# Patient Record
Sex: FEMALE | Race: BLACK OR AFRICAN AMERICAN | NOT HISPANIC OR LATINO | Employment: FULL TIME | URBAN - METROPOLITAN AREA
[De-identification: names, ages, dates, MRNs, and addresses within clinical notes are randomized per-mention and may not be internally consistent; named-entity substitution may affect disease eponyms.]

---

## 2022-07-19 DIAGNOSIS — N64.4 BREAST PAIN, RIGHT: Primary | ICD-10-CM

## 2022-09-14 ENCOUNTER — OFFICE VISIT (OUTPATIENT)
Dept: SURGERY | Facility: CLINIC | Age: 42
End: 2022-09-14
Payer: COMMERCIAL

## 2022-09-14 VITALS
SYSTOLIC BLOOD PRESSURE: 116 MMHG | HEART RATE: 70 BPM | TEMPERATURE: 98 F | DIASTOLIC BLOOD PRESSURE: 79 MMHG | RESPIRATION RATE: 18 BRPM | HEIGHT: 62 IN | OXYGEN SATURATION: 99 % | BODY MASS INDEX: 28.89 KG/M2 | WEIGHT: 157 LBS

## 2022-09-14 DIAGNOSIS — N64.4 FOCAL NON-CYCLICAL BREAST PAIN: ICD-10-CM

## 2022-09-14 DIAGNOSIS — R92.8 ABNORMAL MAMMOGRAM: Primary | ICD-10-CM

## 2022-09-14 PROCEDURE — 3008F BODY MASS INDEX DOCD: CPT | Mod: CPTII,S$GLB,, | Performed by: PHYSICIAN ASSISTANT

## 2022-09-14 PROCEDURE — 99205 OFFICE O/P NEW HI 60 MIN: CPT | Mod: S$GLB,,, | Performed by: PHYSICIAN ASSISTANT

## 2022-09-14 PROCEDURE — 3078F DIAST BP <80 MM HG: CPT | Mod: CPTII,S$GLB,, | Performed by: PHYSICIAN ASSISTANT

## 2022-09-14 PROCEDURE — 99205 PR OFFICE/OUTPT VISIT, NEW, LEVL V, 60-74 MIN: ICD-10-PCS | Mod: S$GLB,,, | Performed by: PHYSICIAN ASSISTANT

## 2022-09-14 PROCEDURE — 3074F SYST BP LT 130 MM HG: CPT | Mod: CPTII,S$GLB,, | Performed by: PHYSICIAN ASSISTANT

## 2022-09-14 PROCEDURE — 99999 PR PBB SHADOW E&M-EST. PATIENT-LVL V: CPT | Mod: PBBFAC,,, | Performed by: PHYSICIAN ASSISTANT

## 2022-09-14 PROCEDURE — 1159F PR MEDICATION LIST DOCUMENTED IN MEDICAL RECORD: ICD-10-PCS | Mod: CPTII,S$GLB,, | Performed by: PHYSICIAN ASSISTANT

## 2022-09-14 PROCEDURE — 1159F MED LIST DOCD IN RCRD: CPT | Mod: CPTII,S$GLB,, | Performed by: PHYSICIAN ASSISTANT

## 2022-09-14 PROCEDURE — 3008F PR BODY MASS INDEX (BMI) DOCUMENTED: ICD-10-PCS | Mod: CPTII,S$GLB,, | Performed by: PHYSICIAN ASSISTANT

## 2022-09-14 PROCEDURE — 99999 PR PBB SHADOW E&M-EST. PATIENT-LVL V: ICD-10-PCS | Mod: PBBFAC,,, | Performed by: PHYSICIAN ASSISTANT

## 2022-09-14 PROCEDURE — 3078F PR MOST RECENT DIASTOLIC BLOOD PRESSURE < 80 MM HG: ICD-10-PCS | Mod: CPTII,S$GLB,, | Performed by: PHYSICIAN ASSISTANT

## 2022-09-14 PROCEDURE — 3074F PR MOST RECENT SYSTOLIC BLOOD PRESSURE < 130 MM HG: ICD-10-PCS | Mod: CPTII,S$GLB,, | Performed by: PHYSICIAN ASSISTANT

## 2022-09-14 NOTE — PROGRESS NOTES
[facility] [place] [facility]  Breast Surgical Oncology  New Patient Office Visit - H&P      Referring Provider: Dr. Santosh Espinoza  PCP: Santosh Espinoza MD   Care Team:    Chief Complaint:   Chief Complaint   Patient presents with    Breast Pain     New Patient with intermittent right breast pain, sharp pain          Subjective:     HPI:  Renetta Negro is a 42 y.o. female who presents on 2022 for evaluation of  abnormal breast imaging and bilateral breast pain .     Her breast pain began last year and her PCP sent her for imaging work up at outside facility, which was determined to be BIRADS 3 (probably benign) of a right breast cyst cluster in the 6:00 and 9:00 position. Most recent imaging on 2022 at [facility] in [place] was again BIRADS 3 with findings consistent with a complicated cyst or possible enlarged duct. The pain still comes and goes, and is primarily located in the left breast UOQ and the inferior portion of the right breast. It is sometimes worse during her menstrual cycle, although they both hurt today and she is 1 week past the start of her cycle. She does not consume caffeine. She wears both wired bras and underwire bras.    She currently denies any other breast issues including rashes, redness, swelling, nipple discharge, or new lumps/masses. She has no family history of breast cancer and no prior breast issues.    Imagin2021  SCR MG at [facility] - BIRADS 0: INCOMPLETE: There is a small nodule at the 6:00 position anterior depth right breast. No suspicious findings on the left breast.    2022 Right Breast Complete US at [facility] [place] - BIRADS 3: Probably Benign: In the 6:00 position, there is a 6.6 x 5 x 5.9 mm hypoechoic lesion with septations. No other right breast sonographic masses identified. 6 month repeat US recommended.    2022 Right Breast Complete US at [facility] [place] - The previously demonstrated complex cystic nodule at the 6:00  position has increased in size now measuring 9 x 6 x 5 mm compared to 6.6 x 5 x 5.9 mm. No suspicious shadowing. There is a new mildly complex cystic nodule on the noncontrasted position measuring 4 mm without suspicious  shadowing.  Mild interval increase in size of the 6:00 benign cyst cluster with  new small cyst cluster at the 9:00 position. BIRADS: 3 - Probably benign Three-month follow-up ultrasound recommended.    Pathology:  None    OB/GYN History:  Age at Menarche Onset: 13  Menopausal Status: premenopausal, LMP: Patient's last menstrual period was 2022.  Hysterectomy/Oophorectomy: no  Hormonal birth control (duration): 3 years total  Pregnancy History:   Hormone Replacement Therapy: No, none    Other:  # of breast biopsies (when and pathology results): none  MG breast density: Unknown  Prior thoracic RT: none  Genetic testing: none  Ashkenazi Anabaptist descent: No    Family History:  Family History   Problem Relation Age of Onset    Hypertension Mother     Hypertension Father     Hypertension Maternal Grandmother     Stroke Maternal Grandmother     Stroke Maternal Grandfather         Patient History:  No past medical history on file.    Body mass index is 28.72 kg/m².     Past Surgical History:   Procedure Laterality Date    CHOLECYSTECTOMY         Social History     Socioeconomic History    Marital status: Single   Tobacco Use    Smoking status: Never    Smokeless tobacco: Never   Substance and Sexual Activity    Alcohol use: Never    Drug use: Never    Sexual activity: Not Currently         There is no immunization history on file for this patient.    Medications/Allergies:  No current outpatient medications on file.     Review of patient's allergies indicates:  No Known Allergies    Review of Systems:  Pertinent items are noted in HPI.     Objective:     Vitals:  Vitals:    22 0904   BP: 116/79   Pulse: 70   Resp: 18   Temp: 98.4 °F (36.9 °C)       Physical Exam:  General: The patient is  awake, alert and oriented times three. The patient is well nourished and in no acute distress.  Neck: There is no evidence of palpable cervical, supraclavicular or axillary adenopathy. The neck is supple. The thyroid is not enlarged.  Musculoskeletal: The patient has a normal range of motion of her bilateral upper extremities.  Chest: Examination of the chest wall fails to reveal any obvious abnormalities. Nonlabored breathing, symmetric expansion.  Breast:  Right: There is tenderness to the inferior portion of the left breast. No palpable lumps in the areas of concern on imaging (6:00 and 9:00 right breast). Examination of right breast fails to reveal any dominant masses or areas of significant focal nodularity. The nipple is everted without evidence of discharge. There is no skin dimpling with movement of the pectoralis. There are no significant skin changes overlying the breast.   Left: There is tenderness to the UOQ and axillary tail. Examination of the left breast fails to reveal any dominant masses or areas of significant focal nodularity. The nipple is everted without evidence of discharge. There is no skin dimpling with movement of the pectoralis. There are no significant skin changes overlying the breast.  Abdomen: The abdomen is soft, flat, nontender and nondistended.  Integumentary: no rashes or skin lesions present  Neurologic: cranial nerves intact, no signs of peripheral neurological deficit, motor/sensory function intact      Assessment and Plan:         Renetta was seen today for breast pain.    Diagnoses and all orders for this visit:    Abnormal mammogram  -     Mammo Digital Diagnostic Bilat with Dylan; Future  -     US Breast Right Limited; Future    Focal non-cyclical breast pain  -     Ambulatory referral/consult to Breast Surgery  -     Mammo Digital Diagnostic Bilat with Dylan; Future  -     US Breast Right Limited; Future        Plan:         I discussed ways to reduce breast pain/tenderness.  Avoid caffeine (coffee, teas, chocolate, sodas). Drinking alcohol may also increase the risk of fibrocystic changes and breast pain. I also advised to wear a good, supportive bra both day and night when symptoms are worse. Avoid underwire bras. She may also consider taking Vitamin E supplementation to reduce pain.     Recommend BL DG MG and Right Breast US to evaluate new bilateral focal breast tenderness and to evaluate abnormal imaging findings on the right breast seen at outside imaging facility. She can have this done in October when she is due for 3 month follow up of abnormal right breast US.          All of her questions were answered. She was advised to call if she develops any questions or concerns.    Christina Yuan PA-C              --------------------------------------------------------------------------------------------------------------  Total time on the date of the visit ranged from 60-74 mins (71427). Total time includes both face-to-face and non-face-to-face time personally spent by myself on the day of the visit.    Non-face-to-face time included:  _X_ preparing to see the patient such as reviewing the patient record  _X_ obtaining and reviewing separately obtained history  _X_ independently interpreting results  _X_ documenting clinical information in electronic health record.    Face-to-face time included:  _X_ performing an appropriate history and examination  _X_ communicating results to the patient  _X_ counseling and educating the patient  __ ordering appropriate medications  _x_ ordering appropriate tests  _X_ ordering appropriate procedures (including follow-up)  _X_ answering any questions the patient had    Total Time spent on date of visit: 61 minutes

## 2022-11-09 ENCOUNTER — HOSPITAL ENCOUNTER (OUTPATIENT)
Dept: RADIOLOGY | Facility: HOSPITAL | Age: 42
Discharge: HOME | End: 2022-11-09
Attending: PHYSICIAN ASSISTANT
Payer: COMMERCIAL

## 2022-11-09 DIAGNOSIS — R92.8 ABNORMAL MAMMOGRAM: ICD-10-CM

## 2022-11-09 DIAGNOSIS — N64.4 FOCAL NON-CYCLICAL BREAST PAIN: ICD-10-CM

## 2022-11-09 PROCEDURE — 76641 ULTRASOUND BREAST COMPLETE: CPT | Mod: TC,50

## 2022-11-09 PROCEDURE — 77066 DX MAMMO INCL CAD BI: CPT | Mod: TC

## 2022-11-09 PROCEDURE — 76641 ULTRASOUND BREAST COMPLETE: CPT | Mod: 26,,, | Performed by: RADIOLOGY

## 2022-11-09 PROCEDURE — 76641 US BREAST BILATERAL COMPLETE: ICD-10-PCS | Mod: 26,,, | Performed by: RADIOLOGY

## 2022-11-09 PROCEDURE — 77066 DX MAMMO INCL CAD BI: CPT | Mod: 26,,, | Performed by: RADIOLOGY

## 2022-11-09 PROCEDURE — 77062 MAMMO DIGITAL DIAGNOSTIC BILAT WITH TOMO: ICD-10-PCS | Mod: 26,,, | Performed by: RADIOLOGY

## 2022-11-09 PROCEDURE — 77062 BREAST TOMOSYNTHESIS BI: CPT | Mod: 26,,, | Performed by: RADIOLOGY

## 2022-11-09 PROCEDURE — 77066 MAMMO DIGITAL DIAGNOSTIC BILAT WITH TOMO: ICD-10-PCS | Mod: 26,,, | Performed by: RADIOLOGY

## 2022-11-23 ENCOUNTER — OFFICE VISIT (OUTPATIENT)
Dept: SURGERY | Facility: CLINIC | Age: 42
End: 2022-11-23
Payer: COMMERCIAL

## 2022-11-23 VITALS
HEART RATE: 80 BPM | TEMPERATURE: 98 F | HEIGHT: 62 IN | WEIGHT: 161.19 LBS | BODY MASS INDEX: 29.66 KG/M2 | SYSTOLIC BLOOD PRESSURE: 125 MMHG | OXYGEN SATURATION: 97 % | DIASTOLIC BLOOD PRESSURE: 80 MMHG | RESPIRATION RATE: 18 BRPM

## 2022-11-23 DIAGNOSIS — N60.12 FIBROCYSTIC BREAST CHANGES OF BOTH BREASTS: ICD-10-CM

## 2022-11-23 DIAGNOSIS — N60.09 BENIGN BREAST CYST IN FEMALE, UNSPECIFIED LATERALITY: Primary | ICD-10-CM

## 2022-11-23 DIAGNOSIS — N60.11 FIBROCYSTIC BREAST CHANGES OF BOTH BREASTS: ICD-10-CM

## 2022-11-23 PROCEDURE — 99999 PR PBB SHADOW E&M-EST. PATIENT-LVL III: CPT | Mod: PBBFAC,,, | Performed by: PHYSICIAN ASSISTANT

## 2022-11-23 PROCEDURE — 99213 PR OFFICE/OUTPT VISIT, EST, LEVL III, 20-29 MIN: ICD-10-PCS | Mod: S$GLB,,, | Performed by: PHYSICIAN ASSISTANT

## 2022-11-23 PROCEDURE — 3079F DIAST BP 80-89 MM HG: CPT | Mod: CPTII,S$GLB,, | Performed by: PHYSICIAN ASSISTANT

## 2022-11-23 PROCEDURE — 1159F MED LIST DOCD IN RCRD: CPT | Mod: CPTII,S$GLB,, | Performed by: PHYSICIAN ASSISTANT

## 2022-11-23 PROCEDURE — 99999 PR PBB SHADOW E&M-EST. PATIENT-LVL III: ICD-10-PCS | Mod: PBBFAC,,, | Performed by: PHYSICIAN ASSISTANT

## 2022-11-23 PROCEDURE — 1159F PR MEDICATION LIST DOCUMENTED IN MEDICAL RECORD: ICD-10-PCS | Mod: CPTII,S$GLB,, | Performed by: PHYSICIAN ASSISTANT

## 2022-11-23 PROCEDURE — 3074F SYST BP LT 130 MM HG: CPT | Mod: CPTII,S$GLB,, | Performed by: PHYSICIAN ASSISTANT

## 2022-11-23 PROCEDURE — 3008F BODY MASS INDEX DOCD: CPT | Mod: CPTII,S$GLB,, | Performed by: PHYSICIAN ASSISTANT

## 2022-11-23 PROCEDURE — 99213 OFFICE O/P EST LOW 20 MIN: CPT | Mod: S$GLB,,, | Performed by: PHYSICIAN ASSISTANT

## 2022-11-23 PROCEDURE — 3008F PR BODY MASS INDEX (BMI) DOCUMENTED: ICD-10-PCS | Mod: CPTII,S$GLB,, | Performed by: PHYSICIAN ASSISTANT

## 2022-11-23 PROCEDURE — 3079F PR MOST RECENT DIASTOLIC BLOOD PRESSURE 80-89 MM HG: ICD-10-PCS | Mod: CPTII,S$GLB,, | Performed by: PHYSICIAN ASSISTANT

## 2022-11-23 PROCEDURE — 3074F PR MOST RECENT SYSTOLIC BLOOD PRESSURE < 130 MM HG: ICD-10-PCS | Mod: CPTII,S$GLB,, | Performed by: PHYSICIAN ASSISTANT

## 2022-11-23 NOTE — PROGRESS NOTES
[facility]  Breast Surgical Oncology  New Patient Office Visit - H&P      Referring Provider: No ref. provider found  PCP: Santosh Espinoza MD   Care Team:    Chief Complaint:   Chief Complaint   Patient presents with    Follow-up     Patient presents today for a follow up for CBE and MG/US results. Patient is well, does not have any breast pain.       Subjective:     Interval History:  2022 - Renetta Negro returns today for follow up. Repeat diagnostic breast imaging was performed 2022 at [facility] with attention to patient's area of pain and follow up of right breast probably benign masses. All findings on the MG and US are benign.     HPI:  Renetta Negro is a 42 y.o. female who initially presents on 2022 for evaluation of  abnormal breast imaging and bilateral breast pain .     Her breast pain began last year and her PCP sent her for imaging work up at outside facility, which was determined to be BIRADS 3 (probably benign) of a right breast cyst cluster in the 6:00 and 9:00 position. Most recent imaging on 2022 at [facility] [place] was again BIRADS 3 with findings consistent with a complicated cyst or possible enlarged duct. The pain still comes and goes, and is primarily located in the left breast UOQ and the inferior portion of the right breast. It is sometimes worse during her menstrual cycle, although they both hurt today and she is 1 week past the start of her cycle. She does not consume caffeine. She wears both wired bras and underwire bras.    She currently denies any other breast issues including rashes, redness, swelling, nipple discharge, or new lumps/masses. She has no family history of breast cancer and no prior breast issues.    Imagin2021 BL SCR MG at [facility] - BIRADS 0: INCOMPLETE: There is a small nodule at the 6:00 position anterior depth right breast. No suspicious findings on the left breast.    2022 Right  Breast Complete US at [facility] [place] - BIRADS 3: Probably Benign: In the 6:00 position, there is a 6.6 x 5 x 5.9 mm hypoechoic lesion with septations. No other right breast sonographic masses identified. 6 month repeat US recommended.    7/12/2022 Right Breast Complete US at [facility] - The previously demonstrated complex cystic nodule at the 6:00 position has increased in size now measuring 9 x 6 x 5 mm compared to 6.6 x 5 x 5.9 mm. No suspicious shadowing. There is a new mildly complex cystic nodule on the noncontrasted position measuring 4 mm without suspicious  shadowing.  Mild interval increase in size of the 6:00 benign cyst cluster with  new small cyst cluster at the 9:00 position. BIRADS: 3 - Probably benign Three-month follow-up ultrasound recommended.    11/9/2022 BL DG MG and BL Breast US to evaluate bilateral breast pain and to evaluate probably benign masses in the right breast - BIRADS 2: BENIGN: On mammogram: No suspicious masses, calcifications, or other signs of malignancy are identified.   There are multiple circumscribed masses in both breasts compatible with cysts, relatively unchanged from the prior exam.   No single lesion is suspicious for malignancy. On US: Complete ultrasound evaluation of the bilateral breasts and axillae was performed.  No suspicious sonographic abnormality is seen.  A few benign cysts of varying size and complexity are noted in the bilateral breasts.  The largest in the right breast is an 8 x 5 x 8 mm benign cyst cluster in the 6:00 subareolar right breast.  The largest in the left breast is a 6 x 4 x 3 mm benign, simple cyst in the 4:00 left breast, 5 cm from the nipple.  Benign-appearing lymph nodes are noted in the bilateral axillae.    Pathology:  None    OB/GYN History:  Age at Menarche Onset: 13  Menopausal Status: premenopausal, LMP: Patient's last menstrual period was 11/10/2022.  Hysterectomy/Oophorectomy: no  Hormonal birth control (duration): 3 years  total  Pregnancy History:   Hormone Replacement Therapy: No, none    Other:  # of breast biopsies (when and pathology results): none  MG breast density: Unknown  Prior thoracic RT: none  Genetic testing: none  Ashkenazi Holiness descent: No    Family History:  Family History   Problem Relation Age of Onset    Hypertension Mother     Hypertension Father     Hypertension Maternal Grandmother     Stroke Maternal Grandmother     Stroke Maternal Grandfather         Patient History:  No past medical history on file.    Body mass index is 29.48 kg/m².     Past Surgical History:   Procedure Laterality Date    CHOLECYSTECTOMY         Social History     Socioeconomic History    Marital status: Single   Tobacco Use    Smoking status: Never    Smokeless tobacco: Never   Substance and Sexual Activity    Alcohol use: Never    Drug use: Never    Sexual activity: Not Currently         There is no immunization history on file for this patient.    Medications/Allergies:  No current outpatient medications on file.     Review of patient's allergies indicates:  No Known Allergies    Review of Systems:  Pertinent items are noted in HPI.     Objective:     Vitals:  Vitals:    22 1314   BP: 125/80   Pulse: 80   Resp: 18   Temp: 98.3 °F (36.8 °C)         Physical Exam:  General: The patient is awake, alert and oriented times three. The patient is well nourished and in no acute distress.  Neck: There is no evidence of palpable cervical, supraclavicular or axillary adenopathy. The neck is supple. The thyroid is not enlarged.  Musculoskeletal: The patient has a normal range of motion of her bilateral upper extremities.  Chest: Examination of the chest wall fails to reveal any obvious abnormalities. Nonlabored breathing, symmetric expansion.  Breast:  Right: There is tenderness to the inferior portion of the left breast. No palpable lumps in the areas of concern on imaging (6:00 and 9:00 right breast). Examination of right breast fails  to reveal any dominant masses or areas of significant focal nodularity. The nipple is everted without evidence of discharge. There is no skin dimpling with movement of the pectoralis. There are no significant skin changes overlying the breast.   Left: There is tenderness to the UOQ and axillary tail. Examination of the left breast fails to reveal any dominant masses or areas of significant focal nodularity. The nipple is everted without evidence of discharge. There is no skin dimpling with movement of the pectoralis. There are no significant skin changes overlying the breast.  Abdomen: The abdomen is soft, flat, nontender and nondistended.  Integumentary: no rashes or skin lesions present  Neurologic: cranial nerves intact, no signs of peripheral neurological deficit, motor/sensory function intact      Assessment and Plan:         Renetta was seen today for follow-up.    Diagnoses and all orders for this visit:    Benign breast cyst in female, unspecified laterality    Fibrocystic breast changes of both breasts          Plan:       Reassurance provided that repeat breast imaging reveals all benign findings and she is recommended to return to annual screening.     Discussed fibrocystic breast pain. I discussed ways to reduce breast pain/tenderness. Avoid caffeine (coffee, teas, chocolate, sodas). Drinking alcohol may also increase the risk of fibrocystic changes and breast pain. I also advised to wear a good, supportive bra both day and night when symptoms are worse. Avoid contact sports and other activities which could cause injury to the breasts. May take Ibuprofen/Tylenol or apply heat as needed for pain.    RTC PRN. Discussed recommendations to follow up with PCP for annual screening mammograms (next due 11/2023).        All of her questions were answered. She was advised to call if she develops any questions or concerns.    Christina Yuan,  EZEQUIEL            --------------------------------------------------------------------------------------------------------------  Total time on the date of the visit ranged from 20-29 mins ([id number]). Total time includes both face-to-face and non-face-to-face time personally spent by myself on the day of the visit.    Non-face-to-face time included:  _X_ preparing to see the patient such as reviewing the patient record  __ obtaining and reviewing separately obtained history  _X_ independently interpreting results  _X_ documenting clinical information in electronic health record.    Face-to-face time included:  _X_ performing an appropriate history and examination  _X_ communicating results to the patient  _X_ counseling and educating the patient  __ ordering appropriate medications  __ ordering appropriate tests  _X_ ordering appropriate procedures (including follow-up)  _X_ answering any questions the patient had    Total Time spent on date of visit: 25 minutes